# Patient Record
Sex: FEMALE | Race: WHITE | NOT HISPANIC OR LATINO | Employment: OTHER | URBAN - METROPOLITAN AREA
[De-identification: names, ages, dates, MRNs, and addresses within clinical notes are randomized per-mention and may not be internally consistent; named-entity substitution may affect disease eponyms.]

---

## 2024-08-28 ENCOUNTER — OFFICE VISIT (OUTPATIENT)
Dept: URGENT CARE | Facility: URGENT CARE | Age: 74
End: 2024-08-28
Payer: COMMERCIAL

## 2024-08-28 VITALS
OXYGEN SATURATION: 97 % | HEIGHT: 66 IN | BODY MASS INDEX: 17.68 KG/M2 | TEMPERATURE: 97.2 F | HEART RATE: 80 BPM | DIASTOLIC BLOOD PRESSURE: 70 MMHG | SYSTOLIC BLOOD PRESSURE: 104 MMHG | RESPIRATION RATE: 16 BRPM | WEIGHT: 110 LBS

## 2024-08-28 DIAGNOSIS — T63.441A BEE STING REACTION, ACCIDENTAL OR UNINTENTIONAL, INITIAL ENCOUNTER: Primary | ICD-10-CM

## 2024-08-28 PROCEDURE — 99203 OFFICE O/P NEW LOW 30 MIN: CPT | Performed by: PHYSICIAN ASSISTANT

## 2024-08-28 RX ORDER — PREDNISONE 20 MG/1
40 TABLET ORAL DAILY
Qty: 10 TABLET | Refills: 0 | Status: SHIPPED | OUTPATIENT
Start: 2024-08-28 | End: 2024-09-02

## 2024-08-28 NOTE — PROGRESS NOTES
"Assessment & Plan     Bee sting reaction, accidental or unintentional, initial encounter  Pt has not had improvement with conservative measures.  Will do short course of prednisone, continue elevation, ice, benadryl.  PI given and discussed.  Indications to return discussed.  Low concenr for infection at this time but will have her return if redness, swelling, pain worsening after 72 hours.      - predniSONE (DELTASONE) 20 MG tablet  Dispense: 10 tablet; Refill: 0             Lina Watson PA-C  [facility] [place]    Get Martinez is a 73 year old female who presents to clinic today for the following health issues:  Chief Complaint   Patient presents with    Urgent Care     Stung by a bee on left hand yesterday afternoon. Very swollen and red.        HPI    Michelle presents to urgent are after inset bite to the L hand yesterday.  Has had increased redness, swelling.    No fevers. She has a ring that won't come off normally on the L finger but since the swelling has become very tight.   No T/N.  Has tried benadryl, elevation and ice without improvement.          Review of Systems  Constitutional, HEENT, cardiovascular, pulmonary, gi and gu systems are negative, except as otherwise noted.      Objective    /70   Pulse 80   Temp 97.2  F (36.2  C) (Temporal)   Resp 16   Ht 1.676 m (5' 6\")   Wt 49.9 kg (110 lb)   SpO2 97%   BMI 17.75 kg/m    Physical Exam   Exam of the LUE reveals mild erythema, swelling from L digits to the elbow, dorsal and laterally. Ring tightly in place not able to be removed with conservative measures.  No open wounds or ulcerations.    Ring removed easily with ring cutter.    Sensation and peripheral pulses intact. Cap refill brisk.                "